# Patient Record
Sex: MALE | Race: WHITE | NOT HISPANIC OR LATINO | URBAN - METROPOLITAN AREA
[De-identification: names, ages, dates, MRNs, and addresses within clinical notes are randomized per-mention and may not be internally consistent; named-entity substitution may affect disease eponyms.]

---

## 2018-06-13 ENCOUNTER — OFFICE VISIT (OUTPATIENT)
Dept: URGENT CARE | Facility: CLINIC | Age: 38
End: 2018-06-13
Payer: COMMERCIAL

## 2018-06-13 VITALS
RESPIRATION RATE: 18 BRPM | DIASTOLIC BLOOD PRESSURE: 82 MMHG | OXYGEN SATURATION: 96 % | TEMPERATURE: 98.5 F | HEART RATE: 73 BPM | SYSTOLIC BLOOD PRESSURE: 138 MMHG

## 2018-06-13 DIAGNOSIS — N48.1 BALANITIS: ICD-10-CM

## 2018-06-13 DIAGNOSIS — R30.0 DYSURIA: Primary | ICD-10-CM

## 2018-06-13 LAB
SL AMB  POCT GLUCOSE, UA: NEGATIVE
SL AMB LEUKOCYTE ESTERASE,UA: ABNORMAL
SL AMB POCT BILIRUBIN,UA: NEGATIVE
SL AMB POCT BLOOD,UA: NEGATIVE
SL AMB POCT CLARITY,UA: CLEAR
SL AMB POCT COLOR,UA: YELLOW
SL AMB POCT KETONES,UA: NEGATIVE
SL AMB POCT NITRITE,UA: NEGATIVE
SL AMB POCT PH,UA: 6
SL AMB POCT SPECIFIC GRAVITY,UA: 1.02
SL AMB POCT URINE PROTEIN: NEGATIVE
SL AMB POCT UROBILINOGEN: 0.2

## 2018-06-13 PROCEDURE — G0382 LEV 3 HOSP TYPE B ED VISIT: HCPCS | Performed by: FAMILY MEDICINE

## 2018-06-13 PROCEDURE — 99283 EMERGENCY DEPT VISIT LOW MDM: CPT | Performed by: FAMILY MEDICINE

## 2018-06-13 PROCEDURE — 81002 URINALYSIS NONAUTO W/O SCOPE: CPT | Performed by: FAMILY MEDICINE

## 2018-06-13 RX ORDER — OMEPRAZOLE 20 MG/1
CAPSULE, DELAYED RELEASE ORAL
COMMUNITY
Start: 2018-06-04

## 2018-06-13 RX ORDER — NYSTATIN 100000 U/G
CREAM TOPICAL 2 TIMES DAILY
Qty: 15 G | Refills: 0 | Status: SHIPPED | OUTPATIENT
Start: 2018-06-13

## 2018-06-13 RX ORDER — CIPROFLOXACIN 500 MG/1
500 TABLET, FILM COATED ORAL EVERY 12 HOURS SCHEDULED
Qty: 14 TABLET | Refills: 0 | Status: SHIPPED | OUTPATIENT
Start: 2018-06-13 | End: 2018-06-20

## 2018-06-13 RX ORDER — CYCLOBENZAPRINE HCL 10 MG
TABLET ORAL
COMMUNITY
Start: 2018-06-04

## 2018-06-13 NOTE — PROGRESS NOTES
[id number] [facility]        NAME: Zayda Morales is a 44 y o  male  : 1980    MRN: [id number]  DATE: 2018  TIME: 12:06 PM    Assessment and Plan   Dysuria [R30 0]  1  Dysuria  ciprofloxacin (CIPRO) 500 mg tablet    Urine culture   2  Balanitis  nystatin (MYCOSTATIN) cream         Patient Instructions   Patient Instructions   Urine dip shows trace leukocytes no blood, will start empiric Cipro pending culture results  Topical nystatin for balanitis  Patient is instructed to follow up with his PCP or urologist in 3-5 days if his symptoms persist  Urine sent for culture        Proceed to  ER if symptoms worsen  Chief Complaint     Chief Complaint   Patient presents with    Urinary Frequency     Pt reports 2 weeks of urinary burning, penile pain and rectal itching  History of Present Illness       Patient presents with a 2 week history of penile discomfort particularly with an erection, he reports urinary hesitancy, dysuria and discomfort at the glans penis as well as the meatus  He states he called his PCP 5 times today and did not receive a phone call back, he went to the office to get a prescription and alleges that was sent to the wrong pharmacy and that is why he is currently here as he is done with them"  Patient denies fevers or chills, no nausea no vomiting, no flank pain no back pain or abdominal pain  He states his wife recently had a yeast infection  He believes he may have a prostate problem due to a sensation of urinary hesitancy but he does have a a normal urinary stream         Review of Systems   Review of Systems   Constitutional: Negative  Respiratory: Negative  Cardiovascular: Negative  Gastrointestinal: Negative  Genitourinary: Positive for decreased urine volume, difficulty urinating, dysuria, penile pain and urgency  Negative for discharge, genital sores, hematuria, penile swelling, scrotal swelling and testicular pain           Current Medications Current Outpatient Prescriptions:     ciprofloxacin (CIPRO) 500 mg tablet, Take 1 tablet (500 mg total) by mouth every 12 (twelve) hours for 7 days, Disp: 14 tablet, Rfl: 0    cyclobenzaprine (FLEXERIL) 10 mg tablet, , Disp: , Rfl:     nystatin (MYCOSTATIN) cream, Apply topically 2 (two) times a day, Disp: 15 g, Rfl: 0    omeprazole (PriLOSEC) 20 mg delayed release capsule, , Disp: , Rfl:     Current Allergies     Allergies as of 06/13/2018    (No Known Allergies)            The following portions of the patient's history were reviewed and updated as appropriate: allergies, current medications, past family history, past medical history, past social history, past surgical history and problem list      No past medical history on file  No past surgical history on file  No family history on file  Medications have been verified  Objective   /82   Pulse 73   Temp 98 5 °F (36 9 °C)   Resp 18   SpO2 96%        Physical Exam     Physical Exam   Abdominal: Soft  He exhibits no distension  There is no tenderness  Genitourinary:   Genitourinary Comments: Normal appearing penis without lesions, no tenderness with palpation, erythema over the glans penis as well as the meatus    Normal testicles without masses, no hernia

## 2018-06-13 NOTE — PATIENT INSTRUCTIONS
Urine dip shows trace leukocytes no blood, will start empiric Cipro pending culture results  Topical nystatin for balanitis  Patient is instructed to follow up with his PCP or urologist in 3-5 days if his symptoms persist  Urine sent for culture

## 2018-06-16 LAB
BACTERIA UR CULT: NORMAL
Lab: NO GROWTH